# Patient Record
Sex: MALE | Race: OTHER | HISPANIC OR LATINO | URBAN - METROPOLITAN AREA
[De-identification: names, ages, dates, MRNs, and addresses within clinical notes are randomized per-mention and may not be internally consistent; named-entity substitution may affect disease eponyms.]

---

## 2020-12-03 ENCOUNTER — INPATIENT (INPATIENT)
Facility: HOSPITAL | Age: 50
LOS: 2 days | Discharge: HOME | End: 2020-12-06
Attending: HOSPITALIST | Admitting: HOSPITALIST
Payer: MEDICAID

## 2020-12-03 VITALS
SYSTOLIC BLOOD PRESSURE: 170 MMHG | DIASTOLIC BLOOD PRESSURE: 99 MMHG | HEART RATE: 114 BPM | HEIGHT: 65 IN | TEMPERATURE: 100 F | RESPIRATION RATE: 22 BRPM | OXYGEN SATURATION: 94 %

## 2020-12-03 NOTE — ED ADULT TRIAGE NOTE - CHIEF COMPLAINT QUOTE
Pt arrives c/o right sided abdominal pain, SOB, fever x 1 week, worsening today. Denies CP, N/V/D. Tachycardiac in triage. Appears diaphoretic. PMHx HTN

## 2020-12-04 DIAGNOSIS — I26.99 OTHER PULMONARY EMBOLISM WITHOUT ACUTE COR PULMONALE: ICD-10-CM

## 2020-12-04 LAB
ALBUMIN SERPL ELPH-MCNC: 3.9 G/DL — SIGNIFICANT CHANGE UP (ref 3.3–5)
ALP SERPL-CCNC: 143 U/L — HIGH (ref 40–120)
ALT FLD-CCNC: 176 U/L — HIGH (ref 4–41)
ANION GAP SERPL CALC-SCNC: 13 MMO/L — SIGNIFICANT CHANGE UP (ref 7–14)
APPEARANCE UR: CLEAR — SIGNIFICANT CHANGE UP
APTT BLD: 31 SEC — SIGNIFICANT CHANGE UP (ref 27–36.3)
APTT BLD: 33 SEC — SIGNIFICANT CHANGE UP (ref 27–36.3)
APTT BLD: 87.6 SEC — HIGH (ref 27–36.3)
AST SERPL-CCNC: 185 U/L — HIGH (ref 4–40)
B PERT DNA SPEC QL NAA+PROBE: SIGNIFICANT CHANGE UP
BASOPHILS # BLD AUTO: 0.02 K/UL — SIGNIFICANT CHANGE UP (ref 0–0.2)
BASOPHILS NFR BLD AUTO: 0.3 % — SIGNIFICANT CHANGE UP (ref 0–2)
BILIRUB SERPL-MCNC: 0.8 MG/DL — SIGNIFICANT CHANGE UP (ref 0.2–1.2)
BILIRUB UR-MCNC: NEGATIVE — SIGNIFICANT CHANGE UP
BLOOD UR QL VISUAL: NEGATIVE — SIGNIFICANT CHANGE UP
BUN SERPL-MCNC: 8 MG/DL — SIGNIFICANT CHANGE UP (ref 7–23)
C PNEUM DNA SPEC QL NAA+PROBE: SIGNIFICANT CHANGE UP
CALCIUM SERPL-MCNC: 8.7 MG/DL — SIGNIFICANT CHANGE UP (ref 8.4–10.5)
CHLORIDE SERPL-SCNC: 98 MMOL/L — SIGNIFICANT CHANGE UP (ref 98–107)
CO2 SERPL-SCNC: 23 MMOL/L — SIGNIFICANT CHANGE UP (ref 22–31)
COLOR SPEC: SIGNIFICANT CHANGE UP
CREAT SERPL-MCNC: 0.89 MG/DL — SIGNIFICANT CHANGE UP (ref 0.5–1.3)
EOSINOPHIL # BLD AUTO: 0.05 K/UL — SIGNIFICANT CHANGE UP (ref 0–0.5)
EOSINOPHIL NFR BLD AUTO: 0.7 % — SIGNIFICANT CHANGE UP (ref 0–6)
FLUAV H1 2009 PAND RNA SPEC QL NAA+PROBE: SIGNIFICANT CHANGE UP
FLUAV H1 RNA SPEC QL NAA+PROBE: SIGNIFICANT CHANGE UP
FLUAV H3 RNA SPEC QL NAA+PROBE: SIGNIFICANT CHANGE UP
FLUAV SUBTYP SPEC NAA+PROBE: SIGNIFICANT CHANGE UP
FLUBV RNA SPEC QL NAA+PROBE: SIGNIFICANT CHANGE UP
GLUCOSE SERPL-MCNC: 120 MG/DL — HIGH (ref 70–99)
GLUCOSE UR-MCNC: NEGATIVE — SIGNIFICANT CHANGE UP
HADV DNA SPEC QL NAA+PROBE: SIGNIFICANT CHANGE UP
HCOV PNL SPEC NAA+PROBE: SIGNIFICANT CHANGE UP
HCT VFR BLD CALC: 39.5 % — SIGNIFICANT CHANGE UP (ref 39–50)
HCT VFR BLD CALC: 42.2 % — SIGNIFICANT CHANGE UP (ref 39–50)
HGB BLD-MCNC: 13.5 G/DL — SIGNIFICANT CHANGE UP (ref 13–17)
HGB BLD-MCNC: 13.9 G/DL — SIGNIFICANT CHANGE UP (ref 13–17)
HMPV RNA SPEC QL NAA+PROBE: SIGNIFICANT CHANGE UP
HPIV1 RNA SPEC QL NAA+PROBE: SIGNIFICANT CHANGE UP
HPIV2 RNA SPEC QL NAA+PROBE: SIGNIFICANT CHANGE UP
HPIV3 RNA SPEC QL NAA+PROBE: SIGNIFICANT CHANGE UP
HPIV4 RNA SPEC QL NAA+PROBE: SIGNIFICANT CHANGE UP
IMM GRANULOCYTES NFR BLD AUTO: 0.4 % — SIGNIFICANT CHANGE UP (ref 0–1.5)
INR BLD: 1.39 — HIGH (ref 0.88–1.16)
KETONES UR-MCNC: NEGATIVE — SIGNIFICANT CHANGE UP
LEUKOCYTE ESTERASE UR-ACNC: NEGATIVE — SIGNIFICANT CHANGE UP
LIDOCAIN IGE QN: 23.6 U/L — SIGNIFICANT CHANGE UP (ref 7–60)
LYMPHOCYTES # BLD AUTO: 1.45 K/UL — SIGNIFICANT CHANGE UP (ref 1–3.3)
LYMPHOCYTES # BLD AUTO: 20.3 % — SIGNIFICANT CHANGE UP (ref 13–44)
MCHC RBC-ENTMCNC: 28.9 PG — SIGNIFICANT CHANGE UP (ref 27–34)
MCHC RBC-ENTMCNC: 29.5 PG — SIGNIFICANT CHANGE UP (ref 27–34)
MCHC RBC-ENTMCNC: 32.9 % — SIGNIFICANT CHANGE UP (ref 32–36)
MCHC RBC-ENTMCNC: 34.2 % — SIGNIFICANT CHANGE UP (ref 32–36)
MCV RBC AUTO: 86.4 FL — SIGNIFICANT CHANGE UP (ref 80–100)
MCV RBC AUTO: 87.7 FL — SIGNIFICANT CHANGE UP (ref 80–100)
MONOCYTES # BLD AUTO: 1.12 K/UL — HIGH (ref 0–0.9)
MONOCYTES NFR BLD AUTO: 15.7 % — HIGH (ref 2–14)
NEUTROPHILS # BLD AUTO: 4.48 K/UL — SIGNIFICANT CHANGE UP (ref 1.8–7.4)
NEUTROPHILS NFR BLD AUTO: 62.6 % — SIGNIFICANT CHANGE UP (ref 43–77)
NITRITE UR-MCNC: NEGATIVE — SIGNIFICANT CHANGE UP
NRBC # FLD: 0 K/UL — SIGNIFICANT CHANGE UP (ref 0–0)
NRBC # FLD: 0 K/UL — SIGNIFICANT CHANGE UP (ref 0–0)
PH UR: 6.5 — SIGNIFICANT CHANGE UP (ref 5–8)
PLATELET # BLD AUTO: 158 K/UL — SIGNIFICANT CHANGE UP (ref 150–400)
PLATELET # BLD AUTO: 165 K/UL — SIGNIFICANT CHANGE UP (ref 150–400)
PMV BLD: 10.4 FL — SIGNIFICANT CHANGE UP (ref 7–13)
PMV BLD: 10.5 FL — SIGNIFICANT CHANGE UP (ref 7–13)
POTASSIUM SERPL-MCNC: 3.6 MMOL/L — SIGNIFICANT CHANGE UP (ref 3.5–5.3)
POTASSIUM SERPL-SCNC: 3.6 MMOL/L — SIGNIFICANT CHANGE UP (ref 3.5–5.3)
PROT SERPL-MCNC: 7.4 G/DL — SIGNIFICANT CHANGE UP (ref 6–8.3)
PROT UR-MCNC: NEGATIVE — SIGNIFICANT CHANGE UP
PROTHROM AB SERPL-ACNC: 15.6 SEC — HIGH (ref 10.6–13.6)
RAPID RVP RESULT: SIGNIFICANT CHANGE UP
RBC # BLD: 4.57 M/UL — SIGNIFICANT CHANGE UP (ref 4.2–5.8)
RBC # BLD: 4.81 M/UL — SIGNIFICANT CHANGE UP (ref 4.2–5.8)
RBC # FLD: 11.5 % — SIGNIFICANT CHANGE UP (ref 10.3–14.5)
RBC # FLD: 11.7 % — SIGNIFICANT CHANGE UP (ref 10.3–14.5)
RSV RNA SPEC QL NAA+PROBE: SIGNIFICANT CHANGE UP
RV+EV RNA SPEC QL NAA+PROBE: SIGNIFICANT CHANGE UP
SARS-COV-2 RNA SPEC QL NAA+PROBE: SIGNIFICANT CHANGE UP
SARS-COV-2 RNA SPEC QL NAA+PROBE: SIGNIFICANT CHANGE UP
SODIUM SERPL-SCNC: 134 MMOL/L — LOW (ref 135–145)
SP GR SPEC: 1.01 — SIGNIFICANT CHANGE UP (ref 1–1.04)
TROPONIN T, HIGH SENSITIVITY: 10 NG/L — SIGNIFICANT CHANGE UP (ref ?–14)
TROPONIN T, HIGH SENSITIVITY: 9 NG/L — SIGNIFICANT CHANGE UP (ref ?–14)
UROBILINOGEN FLD QL: NORMAL — SIGNIFICANT CHANGE UP
WBC # BLD: 6.28 K/UL — SIGNIFICANT CHANGE UP (ref 3.8–10.5)
WBC # BLD: 7.15 K/UL — SIGNIFICANT CHANGE UP (ref 3.8–10.5)
WBC # FLD AUTO: 6.28 K/UL — SIGNIFICANT CHANGE UP (ref 3.8–10.5)
WBC # FLD AUTO: 7.15 K/UL — SIGNIFICANT CHANGE UP (ref 3.8–10.5)

## 2020-12-04 PROCEDURE — 71275 CT ANGIOGRAPHY CHEST: CPT | Mod: 26

## 2020-12-04 PROCEDURE — 99285 EMERGENCY DEPT VISIT HI MDM: CPT

## 2020-12-04 PROCEDURE — 99223 1ST HOSP IP/OBS HIGH 75: CPT | Mod: AI,GC

## 2020-12-04 RX ORDER — INFLUENZA VIRUS VACCINE 15; 15; 15; 15 UG/.5ML; UG/.5ML; UG/.5ML; UG/.5ML
0.5 SUSPENSION INTRAMUSCULAR ONCE
Refills: 0 | Status: DISCONTINUED | OUTPATIENT
Start: 2020-12-04 | End: 2020-12-06

## 2020-12-04 RX ORDER — HEPARIN SODIUM 5000 [USP'U]/ML
7000 INJECTION INTRAVENOUS; SUBCUTANEOUS ONCE
Refills: 0 | Status: COMPLETED | OUTPATIENT
Start: 2020-12-04 | End: 2020-12-04

## 2020-12-04 RX ORDER — APIXABAN 2.5 MG/1
5 TABLET, FILM COATED ORAL EVERY 12 HOURS
Refills: 0 | Status: DISCONTINUED | OUTPATIENT
Start: 2020-12-04 | End: 2020-12-06

## 2020-12-04 RX ORDER — ACETAMINOPHEN 500 MG
650 TABLET ORAL EVERY 6 HOURS
Refills: 0 | Status: DISCONTINUED | OUTPATIENT
Start: 2020-12-04 | End: 2020-12-06

## 2020-12-04 RX ORDER — AZITHROMYCIN 500 MG/1
500 TABLET, FILM COATED ORAL ONCE
Refills: 0 | Status: COMPLETED | OUTPATIENT
Start: 2020-12-04 | End: 2020-12-04

## 2020-12-04 RX ORDER — HEPARIN SODIUM 5000 [USP'U]/ML
INJECTION INTRAVENOUS; SUBCUTANEOUS
Qty: 25000 | Refills: 0 | Status: DISCONTINUED | OUTPATIENT
Start: 2020-12-04 | End: 2020-12-04

## 2020-12-04 RX ORDER — ACETAMINOPHEN 500 MG
650 TABLET ORAL ONCE
Refills: 0 | Status: COMPLETED | OUTPATIENT
Start: 2020-12-04 | End: 2020-12-04

## 2020-12-04 RX ORDER — CEFTRIAXONE 500 MG/1
1000 INJECTION, POWDER, FOR SOLUTION INTRAMUSCULAR; INTRAVENOUS ONCE
Refills: 0 | Status: COMPLETED | OUTPATIENT
Start: 2020-12-04 | End: 2020-12-04

## 2020-12-04 RX ORDER — KETOROLAC TROMETHAMINE 30 MG/ML
15 SYRINGE (ML) INJECTION EVERY 12 HOURS
Refills: 0 | Status: DISCONTINUED | OUTPATIENT
Start: 2020-12-04 | End: 2020-12-04

## 2020-12-04 RX ORDER — KETOROLAC TROMETHAMINE 30 MG/ML
15 SYRINGE (ML) INJECTION ONCE
Refills: 0 | Status: DISCONTINUED | OUTPATIENT
Start: 2020-12-04 | End: 2020-12-04

## 2020-12-04 RX ADMIN — Medication 650 MILLIGRAM(S): at 01:10

## 2020-12-04 RX ADMIN — CEFTRIAXONE 100 MILLIGRAM(S): 500 INJECTION, POWDER, FOR SOLUTION INTRAMUSCULAR; INTRAVENOUS at 04:40

## 2020-12-04 RX ADMIN — Medication 5 MILLIGRAM(S): at 17:55

## 2020-12-04 RX ADMIN — HEPARIN SODIUM 1600 UNIT(S)/HR: 5000 INJECTION INTRAVENOUS; SUBCUTANEOUS at 12:23

## 2020-12-04 RX ADMIN — HEPARIN SODIUM 1600 UNIT(S)/HR: 5000 INJECTION INTRAVENOUS; SUBCUTANEOUS at 05:04

## 2020-12-04 RX ADMIN — HEPARIN SODIUM 7000 UNIT(S): 5000 INJECTION INTRAVENOUS; SUBCUTANEOUS at 05:03

## 2020-12-04 RX ADMIN — Medication 15 MILLIGRAM(S): at 01:10

## 2020-12-04 RX ADMIN — APIXABAN 5 MILLIGRAM(S): 2.5 TABLET, FILM COATED ORAL at 17:55

## 2020-12-04 RX ADMIN — AZITHROMYCIN 255 MILLIGRAM(S): 500 TABLET, FILM COATED ORAL at 05:17

## 2020-12-04 NOTE — ED PROVIDER NOTE - ATTENDING CONTRIBUTION TO CARE
Urbano FAUSTIN: I agree with the above provided history and exam and addend/modify it as follows.    50M w/ pmh HTN - p/w R lateral lower rib pain radiating to back x several days, assc w/ sob and subjective fever. No n/v/d/c, abd pain, cough, dizziness, dysuria/hematuria, leg swelling/pain. No recent travel, medication change, illness, or hospitalization. Took motrin this morning. Fam hx dad had MI @ age 51 and  from it, gets stress/echo twice annually, last was 2020 and was normal. On exam, abd soft nontender nondistended, franco negative, lungs CTAB. no CVA tenderness.     Plan to check cardiacs, CTA chest (doesn't perc out, given tachycardia at triage) - will hold off on empiric antibiotics at this time until find evidence of focal infection    Portuguese TL used # [id number]    I Bonifacio Clement MD performed a history and physical exam of the patient and discussed their management with the resident and /or advanced care provider. I reviewed the resident and /or ACP's note and agree with the documented findings and plan of care. My medical decision making and observations are found above.

## 2020-12-04 NOTE — H&P ADULT - PROBLEM SELECTOR PLAN 1
- Continue Heparin Pulmonary Embolism   - Patient hemodynamically stable   - Transition from Heparin to Eliquis   - Monitor vitals

## 2020-12-04 NOTE — H&P ADULT - ATTENDING COMMENTS
50 y.o. Male w/ hx  HTN  p/w  R lateral lower rib pain radiating to back for several days, assc w/ sob and subjective fever. Has PE on CTA Chest with possible pulm infarct vs PNA, Febrile and tachy. On hep gtt to transition to eliquis. Trop 9, no R heart strain on CT. check TTE. Not hypoxic. Monitoring off

## 2020-12-04 NOTE — ED ADULT NURSE REASSESSMENT NOTE - NS ED NURSE REASSESS COMMENT FT1
Pt abx hanging as ordered, Heparin gtt running at 16mL/hr as ordered. Pt appears in NAD[facility] Pt taken to essu1 at this time.

## 2020-12-04 NOTE — ED PROCEDURE NOTE - ULTRASOUND FINDINGS
List any findings/no RV dilatation or tricuspid regurg, non plethoric IVC, no wall motion abnormalities, no pericardial eff, good squeeze, single confluent B line R lower anterior chest, pleural movement present, no pleural eff no RV dilatation or tricuspid regurg, normal EF, non plethoric IVC, no wall motion abnormalities, no pericardial eff, good squeeze, single confluent B line R lower anterior chest, pleural movement present, no pleural eff/List any findings

## 2020-12-04 NOTE — ED PROVIDER NOTE - PROGRESS NOTE DETAILS
Dr Antonio: spoke with pt's wife [phone] to make aware of diagnosis and that he will be staying with us.

## 2020-12-04 NOTE — ED ADULT NURSE NOTE - NSIMPLEMENTINTERV_GEN_ALL_ED
Implemented All Universal Safety Interventions:  [place] to call system. Call bell, personal items and telephone within reach. Instruct patient to call for assistance. Room bathroom lighting operational. Non-slip footwear when patient is off stretcher. Physically safe environment: no spills, clutter or unnecessary equipment. Stretcher in lowest position, wheels locked, appropriate side rails in place.

## 2020-12-04 NOTE — ED PROVIDER NOTE - CARE PLAN
Principal Discharge DX:	Pulmonary embolism and infarction  Secondary Diagnosis:	Fever  Secondary Diagnosis:	SOB (shortness of breath)

## 2020-12-04 NOTE — ED PROVIDER NOTE - OBJECTIVE STATEMENT
50M hx htn here with R lateral lower rib pain radiating to back for several days, assc w/ sob and subjective fever. not assoc with food, non pleuritic, non exertional. no urinary sx

## 2020-12-04 NOTE — ED PROVIDER NOTE - CLINICAL SUMMARY MEDICAL DECISION MAKING FREE TEXT BOX
50M hx htn here with R lateral lower rib pain radiating to back for several days, assc w/ sob and subjective fever. CTA chest as cant perc out, trop, ekg.

## 2020-12-04 NOTE — H&P ADULT - NSHPPHYSICALEXAM_GEN_ALL_CORE
GEN: Well appearing, well nourished, in no apparent distress.  HEAD: NCAT  HEENT: PERRL, Airway patent, EOMI, non-erythematous pharynx, no exudates, uvula midline, MMM, neck supple, no LAD, no JVD  LUNG: CTAB, no adventitious sounds, no retractions, no nasal flaring  CV: RRR, no murmurs,   Abd: soft, NTND, no rebound or guarding, BS+ in all quadrants, no CVAT, Negative Avila's sign  MSK: WWP, Pulses 2+ in extremities, No edema   Neuro:  AAOx3, Ambulatory with stable gait. SOTELO without laterality  Skin: Warm and dry, no evidence of rash  Psych: normal mood and affect

## 2020-12-04 NOTE — ED PROVIDER NOTE - PHYSICAL EXAMINATION
GEN: Well appearing, well nourished, in no apparent distress.  HEAD: NCAT  HEENT: PERRL, Airway patent, EOMI, non-erythematous pharynx, no exudates, uvula midline, MMM, neck supple, no LAD, no JVD  LUNG: CTAB, no adventitious sounds, no retractions, no nasal flaring  CV: RRR, no murmurs,   Abd: soft, NTND, no rebound or guarding, BS+ in all quadrants, no CVAT  MSK: WWP, Pulses 2+ in extremities, No edema   Neuro:  AAOx3, Ambulatory with stable gait. SOTELO without laterality  Skin: Warm and dry, no evidence of rash  Psych: normal mood and affect

## 2020-12-04 NOTE — ED PROVIDER NOTE - NS ED ROS FT
Constitutional: +fevers, no chills.  Eyes: no visual changes.  Ears: no ear drainage, no ear pain.  Nose: no nasal congestion.  Mouth/Throat: no sore throat.  Cardiovascular: +chest pain.  Respiratory: no shortness of breath, no wheezing, no cough  Gastrointestinal: no nausea, no vomiting, no diarrhea, no abdominal pain.  MSK: no flank pain, no back pain.  Genitourinary: no dysuria, no hematuria.  Skin: no rashes.  Neuro: no headache

## 2020-12-04 NOTE — H&P ADULT - HISTORY OF PRESENT ILLNESS
Patient is a 50 year old male with history of HTN that presents wit Right lateral lower rib pain radiating ot back for several days. Patient reports associated SOB and fever. Denies nausea/vomiting/diarrhea/chills. Denies abdominal pain, dysuria/hematuria. Denies Leg swelling/pain. Patient otherwise has regular follow ups as outpatient, gets stress test/echo twice annually- last exam July 2020 with [facility].

## 2020-12-04 NOTE — H&P ADULT - ASSESSMENT
50 year old male with Hx of HTN presenting with SOB, fevers, and right lateral sternal pain found to have pulmonary embolism on CTA Chest.

## 2020-12-04 NOTE — ED ADULT NURSE NOTE - OBJECTIVE STATEMENT
break coverage RN - pt received in room4 A&OX4 primarily Divehi speaking. pt c/o R sided rib/abdominal pain x several days. Pt denies falls or any trauma to area. Pt also endorsing subjective fevers and SOB. pt appears diaphoretic, rectal temp done and pt found to febrile. Denies n/v/d. Abd soft non distended. Pt slightly tachypneic, O2 sat on room air 97%. NSR on cardiac monitor at this time. Pending further orders @ this time. Will continue to monitor.

## 2020-12-04 NOTE — H&P ADULT - NSHPLABSRESULTS_GEN_ALL_CORE
CBC Full  -  ( 04 Dec 2020 01:05 )  WBC Count : 7.15 K/uL  RBC Count : 4.57 M/uL  Hemoglobin : 13.5 g/dL  Hematocrit : 39.5 %  Platelet Count - Automated : 158 K/uL  Mean Cell Volume : 86.4 fL  Mean Cell Hemoglobin : 29.5 pg  Mean Cell Hemoglobin Concentration : 34.2 %  Auto Neutrophil # : 4.48 K/uL  Auto Lymphocyte # : 1.45 K/uL  Auto Monocyte # : 1.12 K/uL  Auto Eosinophil # : 0.05 K/uL  Auto Basophil # : 0.02 K/uL  Auto Neutrophil % : 62.6 %  Auto Lymphocyte % : 20.3 %  Auto Monocyte % : 15.7 %  Auto Eosinophil % : 0.7 %  Auto Basophil % : 0.3 %                          13.5   7.15  )-----------( 158      ( 04 Dec 2020 01:05 )             39.5       12-04    134<L>  |  98  |  8   ----------------------------<  120<H>  3.6   |  23  |  0.89    Ca    8.7      04 Dec 2020 01:05    TPro  7.4  /  Alb  3.9  /  TBili  0.8  /  DBili  x   /  AST  185<H>  /  ALT  176<H>  /  AlkPhos  143<H>  12-04          Urinalysis Basic - ( 04 Dec 2020 03:46 )    Color: LIGHT YELLOW / Appearance: CLEAR / S.015 / pH: 6.5  Gluc: NEGATIVE / Ketone: NEGATIVE  / Bili: NEGATIVE / Urobili: NORMAL   Blood: NEGATIVE / Protein: NEGATIVE / Nitrite: NEGATIVE   Leuk Esterase: NEGATIVE / RBC: x / WBC x   Sq Epi: x / Non Sq Epi: x / Bacteria: x      PT/INR - ( 04 Dec 2020 03:46 )   PT: 15.6 SEC;   INR: 1.39     PTT - ( 04 Dec 2020 03:46 )  PTT:31.0 SEC    CAPILLARY BLOOD GLUCOSE      PROCEDURE DATE: Dec 4 2020  PROCEDURE:  CT Angiography of the Chest.      FINDINGS:  LUNGS AND AIRWAYS: Patent central airways. Right lower lobe peripheral groundglass and consolidative opacity may reflect pulmonary infarct. Small right lower lobe passive atelectasis. Left lower lobe subsegmental atelectasis.  PLEURA: Small right pleural effusion.  MEDIASTINUM AND VIVIANE: Large mediastinal and left hilar lymph nodes measuring up to 3.9 x 3.9 cm in the subcarinal region and 1.9 cm in the left hilum.  VESSELS: Right lower lobar pulmonary artery embolism with extension into the segmental and subsegmental branches. Normal caliber thoracic aorta.  HEART: Heart size is normal. No pericardial effusion.  CHEST WALL AND LOWER NECK: Bilateral symmetric gynecomastia.  VISUALIZED UPPER ABDOMEN: Within normal limits.  BONES: Within normal limits.    IMPRESSION:  Right lower lobar pulmonary artery embolism with extension into the segmental and subsegmental branches. Right lower lobe peripheral groundglass and consolidative opacity may reflect pulmonary infarct. No CT evidence of right heart strain. More sensitive evaluation with echocardiogram may be obtained as clinically warranted.  Mediastinal and hilar lymphadenopathy.  Small right pleural effusion.

## 2020-12-05 ENCOUNTER — TRANSCRIPTION ENCOUNTER (OUTPATIENT)
Age: 50
End: 2020-12-05

## 2020-12-05 DIAGNOSIS — Z29.9 ENCOUNTER FOR PROPHYLACTIC MEASURES, UNSPECIFIED: ICD-10-CM

## 2020-12-05 LAB
ALBUMIN SERPL ELPH-MCNC: 3.8 G/DL — SIGNIFICANT CHANGE UP (ref 3.3–5)
ALP SERPL-CCNC: 127 U/L — HIGH (ref 40–120)
ALT FLD-CCNC: 115 U/L — HIGH (ref 4–41)
ANION GAP SERPL CALC-SCNC: 13 MMO/L — SIGNIFICANT CHANGE UP (ref 7–14)
AST SERPL-CCNC: 59 U/L — HIGH (ref 4–40)
BILIRUB SERPL-MCNC: 0.6 MG/DL — SIGNIFICANT CHANGE UP (ref 0.2–1.2)
BUN SERPL-MCNC: 12 MG/DL — SIGNIFICANT CHANGE UP (ref 7–23)
CALCIUM SERPL-MCNC: 8.9 MG/DL — SIGNIFICANT CHANGE UP (ref 8.4–10.5)
CHLORIDE SERPL-SCNC: 100 MMOL/L — SIGNIFICANT CHANGE UP (ref 98–107)
CO2 SERPL-SCNC: 23 MMOL/L — SIGNIFICANT CHANGE UP (ref 22–31)
CREAT SERPL-MCNC: 0.87 MG/DL — SIGNIFICANT CHANGE UP (ref 0.5–1.3)
CULTURE RESULTS: NO GROWTH — SIGNIFICANT CHANGE UP
GLUCOSE SERPL-MCNC: 93 MG/DL — SIGNIFICANT CHANGE UP (ref 70–99)
HCT VFR BLD CALC: 40.5 % — SIGNIFICANT CHANGE UP (ref 39–50)
HGB BLD-MCNC: 13.1 G/DL — SIGNIFICANT CHANGE UP (ref 13–17)
MCHC RBC-ENTMCNC: 29.2 PG — SIGNIFICANT CHANGE UP (ref 27–34)
MCHC RBC-ENTMCNC: 32.3 % — SIGNIFICANT CHANGE UP (ref 32–36)
MCV RBC AUTO: 90.2 FL — SIGNIFICANT CHANGE UP (ref 80–100)
PLATELET # BLD AUTO: 179 K/UL — SIGNIFICANT CHANGE UP (ref 150–400)
PMV BLD: 10.7 FL — SIGNIFICANT CHANGE UP (ref 7–13)
POTASSIUM SERPL-MCNC: 3.8 MMOL/L — SIGNIFICANT CHANGE UP (ref 3.5–5.3)
POTASSIUM SERPL-SCNC: 3.8 MMOL/L — SIGNIFICANT CHANGE UP (ref 3.5–5.3)
PROT SERPL-MCNC: 7.4 G/DL — SIGNIFICANT CHANGE UP (ref 6–8.3)
RBC # BLD: 4.49 M/UL — SIGNIFICANT CHANGE UP (ref 4.2–5.8)
RBC # FLD: 11.8 % — SIGNIFICANT CHANGE UP (ref 10.3–14.5)
SODIUM SERPL-SCNC: 136 MMOL/L — SIGNIFICANT CHANGE UP (ref 135–145)
SPECIMEN SOURCE: SIGNIFICANT CHANGE UP
WBC # BLD: 6.81 K/UL — SIGNIFICANT CHANGE UP (ref 3.8–10.5)
WBC # FLD AUTO: 6.81 K/UL — SIGNIFICANT CHANGE UP (ref 3.8–10.5)

## 2020-12-05 PROCEDURE — 99233 SBSQ HOSP IP/OBS HIGH 50: CPT

## 2020-12-05 RX ADMIN — Medication 5 MILLIGRAM(S): at 05:17

## 2020-12-05 RX ADMIN — APIXABAN 5 MILLIGRAM(S): 2.5 TABLET, FILM COATED ORAL at 05:17

## 2020-12-05 RX ADMIN — APIXABAN 5 MILLIGRAM(S): 2.5 TABLET, FILM COATED ORAL at 17:28

## 2020-12-05 NOTE — DISCHARGE NOTE PROVIDER - NSDCCPCAREPLAN_GEN_ALL_CORE_FT
PRINCIPAL DISCHARGE DIAGNOSIS  Diagnosis: Pulmonary embolism and infarction  Assessment and Plan of Treatment: You have been seen and evaluated for shortness of breath due to a blood clot in the lungs. You were put on blood thinners for this condition.  Please make sure to follow up with your PCP regarding this visit.  Please make sure to return to the ED for the following symptoms which include but are not limited to persistent chest pain, shortness of breath, disorientation or any change in baseline that is concerning.        SECONDARY DISCHARGE DIAGNOSES  Diagnosis: Electrolyte abnormality  Assessment and Plan of Treatment: During your stay here it was found that your sodium level was mildly decreased and your liver enzymes were mildly elevated.  Please make sure to follow up with your PCP regarding this visit.   Please make sure to return to the ED for the following symptoms which include but are not limited to loss of consciousness, abdominal pain, or any change in baseline that is concerning.

## 2020-12-05 NOTE — DISCHARGE NOTE PROVIDER - NSFOLLOWUPCLINICS_GEN_ALL_ED_FT
[facility]  Internal Medicine  [id number] [place] [address]  Phone: [phone]  Fax: [phone]  Follow Up Time: Routine

## 2020-12-05 NOTE — DISCHARGE NOTE PROVIDER - HOSPITAL COURSE
49 yo M with PMhx of HTN presented to the ED with R rib pain, found to have R sided PE due to unclear etiology. His EKG and trops were WNL, ruling our ACS. He was treated with heparin and then transitioned to eliquis 5 mg. He is currently stable w/o any signs of RHF. His clinical symptoms and exam have improved. He can be discharged to follow up with his PCP.

## 2020-12-05 NOTE — PROGRESS NOTE ADULT - PROBLEM SELECTOR PLAN 1
Pulmonary Embolism   - Patient hemodynamically stable   - Transition from Heparin to Eliquis   - Monitor vitals Pulmonary Embolism   - Unclear etiology, most likely due to prolonged immobility as a    - Started on heparin and now transitioned to eliquis   - Hemodynamically stable without any signs of RHF  - Monitor vitals

## 2020-12-06 ENCOUNTER — TRANSCRIPTION ENCOUNTER (OUTPATIENT)
Age: 50
End: 2020-12-06

## 2020-12-06 VITALS
DIASTOLIC BLOOD PRESSURE: 90 MMHG | RESPIRATION RATE: 18 BRPM | OXYGEN SATURATION: 98 % | TEMPERATURE: 98 F | HEART RATE: 75 BPM | SYSTOLIC BLOOD PRESSURE: 152 MMHG

## 2020-12-06 LAB
ALBUMIN SERPL ELPH-MCNC: 4.1 G/DL — SIGNIFICANT CHANGE UP (ref 3.3–5)
ALP SERPL-CCNC: 129 U/L — HIGH (ref 40–120)
ALT FLD-CCNC: 95 U/L — HIGH (ref 4–41)
ANION GAP SERPL CALC-SCNC: 10 MMOL/L — SIGNIFICANT CHANGE UP (ref 7–14)
AST SERPL-CCNC: 56 U/L — HIGH (ref 4–40)
BILIRUB SERPL-MCNC: 0.5 MG/DL — SIGNIFICANT CHANGE UP (ref 0.2–1.2)
BUN SERPL-MCNC: 15 MG/DL — SIGNIFICANT CHANGE UP (ref 7–23)
CALCIUM SERPL-MCNC: 9.1 MG/DL — SIGNIFICANT CHANGE UP (ref 8.4–10.5)
CHLORIDE SERPL-SCNC: 98 MMOL/L — SIGNIFICANT CHANGE UP (ref 98–107)
CO2 SERPL-SCNC: 23 MMOL/L — SIGNIFICANT CHANGE UP (ref 22–31)
CREAT SERPL-MCNC: 0.84 MG/DL — SIGNIFICANT CHANGE UP (ref 0.5–1.3)
GLUCOSE SERPL-MCNC: 109 MG/DL — HIGH (ref 70–99)
HAV IGM SER-ACNC: SIGNIFICANT CHANGE UP
HBV CORE IGM SER-ACNC: SIGNIFICANT CHANGE UP
HBV SURFACE AG SER-ACNC: SIGNIFICANT CHANGE UP
HCT VFR BLD CALC: 40.4 % — SIGNIFICANT CHANGE UP (ref 39–50)
HCV AB S/CO SERPL IA: 0.12 S/CO — SIGNIFICANT CHANGE UP (ref 0–0.99)
HCV AB SERPL-IMP: SIGNIFICANT CHANGE UP
HGB BLD-MCNC: 13.5 G/DL — SIGNIFICANT CHANGE UP (ref 13–17)
MCHC RBC-ENTMCNC: 30 PG — SIGNIFICANT CHANGE UP (ref 27–34)
MCHC RBC-ENTMCNC: 33.4 GM/DL — SIGNIFICANT CHANGE UP (ref 32–36)
MCV RBC AUTO: 89.8 FL — SIGNIFICANT CHANGE UP (ref 80–100)
NRBC # BLD: 0 /100 WBCS — SIGNIFICANT CHANGE UP
NRBC # FLD: 0 K/UL — SIGNIFICANT CHANGE UP
PLATELET # BLD AUTO: 183 K/UL — SIGNIFICANT CHANGE UP (ref 150–400)
POTASSIUM SERPL-MCNC: 4 MMOL/L — SIGNIFICANT CHANGE UP (ref 3.5–5.3)
POTASSIUM SERPL-SCNC: 4 MMOL/L — SIGNIFICANT CHANGE UP (ref 3.5–5.3)
PROT SERPL-MCNC: 7.6 G/DL — SIGNIFICANT CHANGE UP (ref 6–8.3)
RBC # BLD: 4.5 M/UL — SIGNIFICANT CHANGE UP (ref 4.2–5.8)
RBC # FLD: 11.9 % — SIGNIFICANT CHANGE UP (ref 10.3–14.5)
SODIUM SERPL-SCNC: 131 MMOL/L — LOW (ref 135–145)
WBC # BLD: 5.9 K/UL — SIGNIFICANT CHANGE UP (ref 3.8–10.5)
WBC # FLD AUTO: 5.9 K/UL — SIGNIFICANT CHANGE UP (ref 3.8–10.5)

## 2020-12-06 PROCEDURE — 99239 HOSP IP/OBS DSCHRG MGMT >30: CPT | Mod: GC

## 2020-12-06 RX ORDER — APIXABAN 2.5 MG/1
1 TABLET, FILM COATED ORAL
Qty: 60 | Refills: 0
Start: 2020-12-06 | End: 2021-01-04

## 2020-12-06 RX ADMIN — Medication 5 MILLIGRAM(S): at 04:30

## 2020-12-06 RX ADMIN — APIXABAN 5 MILLIGRAM(S): 2.5 TABLET, FILM COATED ORAL at 04:30

## 2020-12-06 NOTE — PROGRESS NOTE ADULT - ATTENDING COMMENTS
Patient seen and examined by myself , case discussed  with resident ,agree with the above finding and plan  50 y.o. Male w/ hx  HTN  p/w  R lateral lower rib pain radiating to back for several days, assc w/ sob and subjective fever. Has PE on CTA Chest with possible pulm infarct vs PNA, Febrile and tachy.  Hep gtt  transitioned  to Eliquis. Trop 9, no R heart strain on CT. TTE not performed , can be done as outpatient , . Not hypoxic. Monitoring off oxygen   will ensure Eliquis covered by patient's insurance   transaminitis  likely reactive , LFTs trending  down , will recommend outpatient f/u with PMD with repeat labs for LFTS and mild hyponatremia   pt should have hypercoagulable w/u after completing treatment in 6 months
Patient seen and examined by myself , case discussed  with resident ,agree with the above finding and plan  50 y.o. Male w/ hx  HTN  p/w  R lateral lower rib pain radiating to back for several days, assc w/ sob and subjective fever. Has PE on CTA Chest with possible pulm infarct vs PNA, Febrile and tachy.  Hep gtt  transitioned  to Eliquis. Trop 9, no R heart strain on CT. check TTE. Not hypoxic. Monitoring off  will ensure Eliquis covered by patient's insurance   transaminitis  likely reactive , LFTs trending  down , will continue to monitor

## 2020-12-06 NOTE — DISCHARGE NOTE NURSING/CASE MANAGEMENT/SOCIAL WORK - PATIENT PORTAL LINK FT
You can access the FollowMyHealth Patient Portal offered by [facility] by registering at the following website: [email or web address]. By joining [facility]’s FollowMyHealth portal, you will also be able to view your health information using other applications (apps) compatible with our system.

## 2020-12-06 NOTE — PROGRESS NOTE ADULT - SUBJECTIVE AND OBJECTIVE BOX
PROGRESS NOTE:     Patient is a 50y old  Male who presents with a chief complaint of Right lateral rib pain, fever, sob (05 Dec 2020 19:04)      SUBJECTIVE / OVERNIGHT EVENTS:  NAEO. Patient w/ no subjective complaints       MEDICATIONS  (STANDING):  apixaban 5 milliGRAM(s) Oral every 12 hours  enalapril 5 milliGRAM(s) Oral daily  influenza   Vaccine 0.5 milliLiter(s) IntraMuscular once    MEDICATIONS  (PRN):  acetaminophen   Tablet .. 650 milliGRAM(s) Oral every 6 hours PRN Temp greater or equal to 38C (100.4F), Mild Pain (1 - 3)      CAPILLARY BLOOD GLUCOSE        I&O's Summary      PHYSICAL EXAM:  Vital Signs Last 24 Hrs  T(C): 36.7 (06 Dec 2020 12:13), Max: 36.9 (06 Dec 2020 04:28)  T(F): 98 (06 Dec 2020 12:13), Max: 98.5 (06 Dec 2020 04:28)  HR: 75 (06 Dec 2020 12:13) (75 - 79)  BP: 152/90 (06 Dec 2020 12:13) (131/83 - 152/90)  BP(mean): --  RR: 18 (06 Dec 2020 12:13) (17 - 18)  SpO2: 98% (06 Dec 2020 12:13) (98% - 98%)    CONSTITUTIONAL: NAD, well-developed  RESPIRATORY: Normal respiratory effort; lungs are clear to auscultation bilaterally  CARDIOVASCULAR: Regular rate and rhythm, normal S1 and S2, no murmur/rub/gallop; No lower extremity edema; Peripheral pulses are 2+ bilaterally  ABDOMEN: Nontender to palpation, normoactive bowel sounds, no rebound/guarding; No hepatosplenomegaly  MUSCLOSKELETAL: no clubbing or cyanosis of digits; no joint swelling or tenderness to palpation  NEURO: CN 2-12 grossly intact, moves all limbs spontaneously  PSYCH: A+O to person, place, and time; affect appropriate    LABS:                        13.5   5.90  )-----------( 183      ( 06 Dec 2020 08:32 )             40.4     12-06    131<L>  |  98  |  15  ----------------------------<  109<H>  4.0   |  23  |  0.84    Ca    9.1      06 Dec 2020 08:32    TPro  7.6  /  Alb  4.1  /  TBili  0.5  /  DBili  x   /  AST  56<H>  /  ALT  95<H>  /  AlkPhos  129<H>  12-06              Culture - Blood (collected 04 Dec 2020 07:06)  Source: .Blood Blood-Peripheral  Preliminary Report (05 Dec 2020 08:01):    No growth to date.    Culture - Blood (collected 04 Dec 2020 07:06)  Source: .Blood Blood-Venous  Preliminary Report (05 Dec 2020 08:00):    No growth to date.    Culture - Urine (collected 04 Dec 2020 03:46)  Source: .Urine Clean Catch (Midstream)  Final Report (05 Dec 2020 03:50):    No growth        RADIOLOGY & ADDITIONAL TESTS:  Results Reviewed:   Imaging Personally Reviewed:  Electrocardiogram Personally Reviewed:    COORDINATION OF CARE:  Care Discussed with Consultants/Other Providers [Y/N]:  Prior or Outpatient Records Reviewed [Y/N]:

## 2020-12-06 NOTE — PROGRESS NOTE ADULT - PROBLEM SELECTOR PLAN 3
DVT ppx-eliquis   Transitions of Care Status:  1.  Name of PCP:  2.  PCP Contacted on Admission: [ ] Y    [ ] N    3.  PCP contacted at Discharge: [ ] Y    [ ] N    [ ] N/A  4.  Post-Discharge Appointment Date and Location:  5.  Summary of Handoff given to PCP:
DVT ppx-eliquis   Transitions of Care Status:  1.  Name of PCP:  2.  PCP Contacted on Admission: [ ] Y    [ ] N    3.  PCP contacted at Discharge: [ ] Y    [ ] N    [ ] N/A  4.  Post-Discharge Appointment Date and Location:  5.  Summary of Handoff given to PCP:

## 2020-12-06 NOTE — PROGRESS NOTE ADULT - PROBLEM SELECTOR PLAN 1
Pulmonary Embolism   - Unclear etiology, most likely due to prolonged immobility as a    - Started on heparin and now transitioned to eliquis   - Hemodynamically stable without any signs of RHF  - Monitor vitals

## 2020-12-09 LAB
CULTURE RESULTS: SIGNIFICANT CHANGE UP
CULTURE RESULTS: SIGNIFICANT CHANGE UP
SPECIMEN SOURCE: SIGNIFICANT CHANGE UP
SPECIMEN SOURCE: SIGNIFICANT CHANGE UP

## 2020-12-10 DIAGNOSIS — I10 ESSENTIAL (PRIMARY) HYPERTENSION: ICD-10-CM

## 2022-03-24 NOTE — PROGRESS NOTE ADULT - SUBJECTIVE AND OBJECTIVE BOX
Patient is a 50y old  Male who presents with a chief complaint of Right lateral rib pain, fever, sob (04 Dec 2020 07:46)      SUBJECTIVE / OVERNIGHT EVENTS:          MEDICATIONS  (STANDING):  apixaban 5 milliGRAM(s) Oral every 12 hours  enalapril 5 milliGRAM(s) Oral daily  influenza   Vaccine 0.5 milliLiter(s) IntraMuscular once    MEDICATIONS  (PRN):  acetaminophen   Tablet .. 650 milliGRAM(s) Oral every 6 hours PRN Temp greater or equal to 38C (100.4F), Mild Pain (1 - 3)      Vital Signs Last 24 Hrs  T(C): 37.3 (05 Dec 2020 05:15), Max: 37.9 (05 Dec 2020 00:09)  T(F): 99.2 (05 Dec 2020 05:15), Max: 100.3 (05 Dec 2020 00:09)  HR: 88 (05 Dec 2020 05:15) (80 - 98)  BP: 135/90 (05 Dec 2020 05:15) (135/90 - 159/84)  BP(mean): --  RR: 17 (05 Dec 2020 05:15) (17 - 18)  SpO2: 98% (05 Dec 2020 05:15) (98% - 100%)      PHYSICAL EXAM  GENERAL: NAD, well-developed  HEAD:  Atraumatic, Normocephalic  EYES: EOMI, PERRLA, conjunctiva and sclera clear  NECK: Supple, No JVD  CHEST/LUNG: Clear to auscultation bilaterally; No wheeze  HEART: Regular rate and rhythm; No murmurs, rubs, or gallops  ABDOMEN: Soft, Nontender, Nondistended; Bowel sounds present  EXTREMITIES:  2+ Peripheral Pulses, No clubbing, cyanosis, or edema  PSYCH: AAOx3  SKIN: No rashes or lesions    CAPILLARY BLOOD GLUCOSE        I&O's Summary      LABS:                        13.9   6.28  )-----------( 165      ( 04 Dec 2020 11:15 )             42.2     12-04    134<L>  |  98  |  8   ----------------------------<  120<H>  3.6   |  23  |  0.89    Ca    8.7      04 Dec 2020 01:05    TPro  7.4  /  Alb  3.9  /  TBili  0.8  /  DBili  x   /  AST  185<H>  /  ALT  176<H>  /  AlkPhos  143<H>  12-04    PT/INR - ( 04 Dec 2020 03:46 )   PT: 15.6 SEC;   INR: 1.39          PTT - ( 04 Dec 2020 18:20 )  PTT:33.0 SEC      Urinalysis Basic - ( 04 Dec 2020 03:46 )    Color: LIGHT YELLOW / Appearance: CLEAR / S.015 / pH: 6.5  Gluc: NEGATIVE / Ketone: NEGATIVE  / Bili: NEGATIVE / Urobili: NORMAL   Blood: NEGATIVE / Protein: NEGATIVE / Nitrite: NEGATIVE   Leuk Esterase: NEGATIVE / RBC: x / WBC x   Sq Epi: x / Non Sq Epi: x / Bacteria: x        RADIOLOGY & ADDITIONAL TESTS:     MICROBIOLOGY:    ANTIMICROBIALS:    CONSULTS: Patient is a 50y old  Male who presents with a chief complaint of Right lateral rib pain, fever, sob (04 Dec 2020 07:46)      SUBJECTIVE / OVERNIGHT EVENTS:    No acute events overnight. Patient is AAOX3 this morning. He denies any fever, chills, nausea, vomiting, abdominal pain, chest pain or SOB.       MEDICATIONS  (STANDING):  apixaban 5 milliGRAM(s) Oral every 12 hours  enalapril 5 milliGRAM(s) Oral daily  influenza   Vaccine 0.5 milliLiter(s) IntraMuscular once    MEDICATIONS  (PRN):  acetaminophen   Tablet .. 650 milliGRAM(s) Oral every 6 hours PRN Temp greater or equal to 38C (100.4F), Mild Pain (1 - 3)      Vital Signs Last 24 Hrs  T(C): 37.3 (05 Dec 2020 05:15), Max: 37.9 (05 Dec 2020 00:09)  T(F): 99.2 (05 Dec 2020 05:15), Max: 100.3 (05 Dec 2020 00:09)  HR: 88 (05 Dec 2020 05:15) (80 - 98)  BP: 135/90 (05 Dec 2020 05:15) (135/90 - 159/84)  BP(mean): --  RR: 17 (05 Dec 2020 05:15) (17 - 18)  SpO2: 98% (05 Dec 2020 05:15) (98% - 100%)      PHYSICAL EXAM  GENERAL: NAD, well-developed  HEAD:  Atraumatic, Normocephalic  EYES: Conjunctiva and sclera clear  NECK: Supple, No JVD  CHEST/LUNG: Clear to auscultation bilaterally; No wheeze  HEART: Regular rate and rhythm; No murmurs, rubs, or gallops  ABDOMEN: Soft, Nontender, Nondistended; Bowel sounds present  EXTREMITIES:  2+ Peripheral Pulses, No clubbing, cyanosis, or edema  PSYCH: AAOx3      CAPILLARY BLOOD GLUCOSE        I&O's Summary      LABS:                        13.9   6.28  )-----------( 165      ( 04 Dec 2020 11:15 )             42.2     12-04    134<L>  |  98  |  8   ----------------------------<  120<H>  3.6   |  23  |  0.89    Ca    8.7      04 Dec 2020 01:05    TPro  7.4  /  Alb  3.9  /  TBili  0.8  /  DBili  x   /  AST  185<H>  /  ALT  176<H>  /  AlkPhos  143<H>  12-04    PT/INR - ( 04 Dec 2020 03:46 )   PT: 15.6 SEC;   INR: 1.39          PTT - ( 04 Dec 2020 18:20 )  PTT:33.0 SEC      Urinalysis Basic - ( 04 Dec 2020 03:46 )    Color: LIGHT YELLOW / Appearance: CLEAR / S.015 / pH: 6.5  Gluc: NEGATIVE / Ketone: NEGATIVE  / Bili: NEGATIVE / Urobili: NORMAL   Blood: NEGATIVE / Protein: NEGATIVE / Nitrite: NEGATIVE   Leuk Esterase: NEGATIVE / RBC: x / WBC x   Sq Epi: x / Non Sq Epi: x / Bacteria: x       denies pain/discomfort

## 2023-07-03 PROBLEM — Z00.00 ENCOUNTER FOR PREVENTIVE HEALTH EXAMINATION: Status: ACTIVE | Noted: 2023-07-03

## 2023-07-13 ENCOUNTER — APPOINTMENT (OUTPATIENT)
Dept: THORACIC SURGERY | Facility: CLINIC | Age: 53
End: 2023-07-13

## 2023-09-07 NOTE — DATA REVIEWED
[FreeTextEntry1] : I have independently reviewed patient's PFTs on 06/13/2023, CT chest on 06/26/2023

## 2023-09-07 NOTE — HISTORY OF PRESENT ILLNESS
[FreeTextEntry1] : Mr. FINA HURLEY, 53 year old male, ....smoker, w/ hx of HTN, asthma, ...., who referred by Dr. Margy An (Pulm).   PFTs on 06/13/2023: FVC 2.77 (68%), FEV1 2.68 (89%), DLCO 99%.   CT chest on 06/26/2023: ([facility]) - Innumerable pulmonary nodules again seen throughout the lungs with a centrilobular and perilymphatic distribution. There is a slight upper lobe predominance. Area of nodular consolidation along the posterior right lower lobe appears decreased since the prior examination.  Patient is here today for CT surgery consultation.

## 2023-09-07 NOTE — CONSULT LETTER
[Dear  ___] : Dear  [unfilled], [Consult Letter:] : I had the pleasure of evaluating your patient, [unfilled]. [Please see my note below.] : Please see my note below. [Consult Closing:] : Thank you very much for allowing me to participate in the care of this patient.  If you have any questions, please do not hesitate to contact me. [Sincerely,] : Sincerely, [FreeTextEntry2] : Dr. Margy An (Pulm/Ref) [FreeTextEntry3] : Felix Owens MD Director of Thoracic, [facility] Assitant Professor Cardiovascular & Thoracic Surgery [facility]

## 2023-09-07 NOTE — ASSESSMENT
[FreeTextEntry1] :    I have reviewed the patient's medical records and diagnostic images at time of this office consultation and have made the following recommendation: 1.	    I, JUSTIN Awan, personally performed the evaluation and management (E/M) services for this new patient.  That E/M includes conducting the initial examination, assessing all conditions, and establishing the plan of care.  Today, my ACP, HANANE CohenC, was here to observe my evaluation and management services for this patient to be followed going forward.

## 2023-09-12 ENCOUNTER — APPOINTMENT (OUTPATIENT)
Dept: THORACIC SURGERY | Facility: CLINIC | Age: 53
End: 2023-09-12
Payer: COMMERCIAL

## 2023-09-12 VITALS
RESPIRATION RATE: 16 BRPM | DIASTOLIC BLOOD PRESSURE: 93 MMHG | WEIGHT: 180 LBS | HEART RATE: 65 BPM | SYSTOLIC BLOOD PRESSURE: 154 MMHG | BODY MASS INDEX: 29.99 KG/M2 | OXYGEN SATURATION: 99 % | HEIGHT: 65 IN

## 2023-09-12 DIAGNOSIS — E78.2 MIXED HYPERLIPIDEMIA: ICD-10-CM

## 2023-09-12 DIAGNOSIS — Z87.09 PERSONAL HISTORY OF OTHER DISEASES OF THE RESPIRATORY SYSTEM: ICD-10-CM

## 2023-09-12 DIAGNOSIS — Z86.79 PERSONAL HISTORY OF OTHER DISEASES OF THE CIRCULATORY SYSTEM: ICD-10-CM

## 2023-09-12 PROCEDURE — 99204 OFFICE O/P NEW MOD 45 MIN: CPT

## 2023-09-12 RX ORDER — ENALAPRIL MALEATE 5 MG/1
TABLET ORAL
Refills: 0 | Status: ACTIVE | COMMUNITY

## 2023-09-12 RX ORDER — ATORVASTATIN CALCIUM 80 MG/1
TABLET, FILM COATED ORAL
Refills: 0 | Status: ACTIVE | COMMUNITY

## 2023-09-13 ENCOUNTER — NON-APPOINTMENT (OUTPATIENT)
Age: 53
End: 2023-09-13

## 2024-01-04 PROBLEM — R91.8 MULTIPLE LUNG NODULES: Status: ACTIVE | Noted: 2023-09-07

## 2024-01-09 ENCOUNTER — APPOINTMENT (OUTPATIENT)
Dept: THORACIC SURGERY | Facility: HOSPITAL | Age: 54
End: 2024-01-09

## 2024-01-09 DIAGNOSIS — R91.8 OTHER NONSPECIFIC ABNORMAL FINDING OF LUNG FIELD: ICD-10-CM

## 2024-01-22 NOTE — HISTORY OF PRESENT ILLNESS
[FreeTextEntry1] : Mr. FINA HURLEY, 53 year old male, never smoker, w/ hx of HTN, HLD, asthma, who referred by Dr. Margy An (Pulm).   PFTs on 06/13/2023: FVC 2.77 (68%), FEV1 2.68 (89%), DLCO 99%.   CT chest on 06/26/2023: ([facility]) - Innumerable pulmonary nodules again seen throughout the lungs with a centrilobular and perilymphatic distribution. There is a slight upper lobe predominance. Area of nodular consolidation along the posterior right lower lobe appears decreased since the prior examination. Differential includes sarcoidosis and atypical inflammation/infection.   CT chest on  -  Patient is here today for a follow up.

## 2024-01-22 NOTE — CONSULT LETTER
[FreeTextEntry2] : Dr. Margy An (Pulm/Ref) [FreeTextEntry3] : Felix Owens MD Director of Thoracic, [facility] Thoracic Surgery Assitant Professor Cardiovascular & Thoracic Surgery [facility]

## 2024-01-23 ENCOUNTER — APPOINTMENT (OUTPATIENT)
Dept: THORACIC SURGERY | Facility: CLINIC | Age: 54
End: 2024-01-23
